# Patient Record
Sex: MALE | Race: WHITE
[De-identification: names, ages, dates, MRNs, and addresses within clinical notes are randomized per-mention and may not be internally consistent; named-entity substitution may affect disease eponyms.]

---

## 2020-11-14 ENCOUNTER — HOSPITAL ENCOUNTER (EMERGENCY)
Dept: HOSPITAL 54 - ER | Age: 30
Discharge: HOME | End: 2020-11-14
Payer: COMMERCIAL

## 2020-11-14 VITALS — SYSTOLIC BLOOD PRESSURE: 135 MMHG | DIASTOLIC BLOOD PRESSURE: 81 MMHG

## 2020-11-14 VITALS — HEIGHT: 70 IN | BODY MASS INDEX: 28.63 KG/M2 | WEIGHT: 200 LBS

## 2020-11-14 DIAGNOSIS — J45.909: ICD-10-CM

## 2020-11-14 DIAGNOSIS — M54.5: Primary | ICD-10-CM

## 2020-11-14 DIAGNOSIS — M54.10: ICD-10-CM

## 2020-11-14 PROCEDURE — 96372 THER/PROPH/DIAG INJ SC/IM: CPT

## 2020-11-14 PROCEDURE — 99283 EMERGENCY DEPT VISIT LOW MDM: CPT

## 2020-11-14 RX ADMIN — KETOROLAC TROMETHAMINE ONE MG: 30 INJECTION, SOLUTION INTRAMUSCULAR at 09:05

## 2020-11-14 RX ADMIN — DIAZEPAM ONE MG: 5 TABLET ORAL at 09:05

## 2020-11-14 NOTE — NUR
PT BIB SELF C/O R LOWER BACK PAIN RADIATING TO HIS RLE WITH NUMBNESS. RATES 
PAIN AT 7/10. VS CHECKED. AWAITING MD PINZON. 

-------------------------------------------------------------------------------

Addendum: 11/14/20 at 0841 by [email or web address]

-------------------------------------------------------------------------------

USER ERROR. PAIN IS IN THE L LOWER BACK.